# Patient Record
Sex: FEMALE | NOT HISPANIC OR LATINO | URBAN - METROPOLITAN AREA
[De-identification: names, ages, dates, MRNs, and addresses within clinical notes are randomized per-mention and may not be internally consistent; named-entity substitution may affect disease eponyms.]

---

## 2023-12-01 ENCOUNTER — OFFICE VISIT (OUTPATIENT)
Dept: DERMATOLOGY | Facility: CLINIC | Age: 23
End: 2023-12-01
Payer: COMMERCIAL

## 2023-12-01 DIAGNOSIS — L70.0 ACNE VULGARIS: Primary | ICD-10-CM

## 2023-12-01 PROCEDURE — 1036F TOBACCO NON-USER: CPT | Performed by: DERMATOLOGY

## 2023-12-01 PROCEDURE — 99213 OFFICE O/P EST LOW 20 MIN: CPT | Performed by: DERMATOLOGY

## 2023-12-01 RX ORDER — CLINDAMYCIN PHOSPHATE 10 UG/ML
LOTION TOPICAL
COMMUNITY
End: 2023-12-01 | Stop reason: SDUPTHER

## 2023-12-01 RX ORDER — TRETINOIN 0.25 MG/G
CREAM TOPICAL
COMMUNITY
Start: 2021-03-31 | End: 2023-12-01 | Stop reason: SDUPTHER

## 2023-12-01 RX ORDER — SPIRONOLACTONE 50 MG/1
75 TABLET, FILM COATED ORAL DAILY
COMMUNITY
Start: 2023-10-19 | End: 2023-12-01 | Stop reason: SDUPTHER

## 2023-12-01 RX ORDER — CLINDAMYCIN PHOSPHATE 10 UG/ML
LOTION TOPICAL
Qty: 60 ML | Refills: 11 | Status: SHIPPED | OUTPATIENT
Start: 2023-12-01

## 2023-12-01 RX ORDER — TRETINOIN 0.25 MG/G
CREAM TOPICAL
Qty: 45 G | Refills: 5 | Status: SHIPPED | OUTPATIENT
Start: 2023-12-01

## 2023-12-01 RX ORDER — BENZOYL PEROXIDE 50 MG/ML
LIQUID TOPICAL
Qty: 227 G | Refills: 5 | Status: SHIPPED | OUTPATIENT
Start: 2023-12-01

## 2023-12-01 RX ORDER — BENZOYL PEROXIDE 50 MG/ML
LIQUID TOPICAL
COMMUNITY
Start: 2023-08-22 | End: 2023-12-01 | Stop reason: SDUPTHER

## 2023-12-01 RX ORDER — SPIRONOLACTONE 50 MG/1
75 TABLET, FILM COATED ORAL DAILY
Qty: 135 TABLET | Refills: 3 | Status: SHIPPED | OUTPATIENT
Start: 2023-12-01 | End: 2024-11-30

## 2023-12-01 ASSESSMENT — DERMATOLOGY PATIENT ASSESSMENT
ARE YOU ON BIRTH CONTROL: NO
FOR PATIENTS COMING IN FOR A FOLLOW-UP VISIT - HAVE THERE BEEN ANY CHANGES IN YOUR HEALTH SINCE YOUR LAST VISIT: NO
DO YOU HAVE ANY NEW OR CHANGING LESIONS: NO
ARE YOU AN ORGAN TRANSPLANT RECIPIENT: NO
DO YOU USE SUNSCREEN: OCCASIONALLY
ARE YOU TRYING TO GET PREGNANT: NO
DO YOU USE A TANNING BED: NO

## 2023-12-01 ASSESSMENT — DERMATOLOGY QUALITY OF LIFE (QOL) ASSESSMENT
RATE HOW BOTHERED YOU ARE BY EFFECTS OF YOUR SKIN PROBLEMS ON YOUR ACTIVITIES (EG, GOING OUT, ACCOMPLISHING WHAT YOU WANT, WORK ACTIVITIES OR YOUR RELATIONSHIPS WITH OTHERS): 0 - NEVER BOTHERED
RATE HOW EMOTIONALLY BOTHERED YOU ARE BY YOUR SKIN PROBLEM (FOR EXAMPLE, WORRY, EMBARRASSMENT, FRUSTRATION): 0 - NEVER BOTHERED
WHAT SINGLE SKIN CONDITION LISTED BELOW IS THE PATIENT ANSWERING THE QUALITY-OF-LIFE ASSESSMENT QUESTIONS ABOUT: ACNE
RATE HOW BOTHERED YOU ARE BY SYMPTOMS OF YOUR SKIN PROBLEM (EG, ITCHING, STINGING BURNING, HURTING OR SKIN IRRITATION): 0 - NEVER BOTHERED
ARE THERE EXCLUSIONS OR EXCEPTIONS FOR THE QUALITY OF LIFE ASSESSMENT: NO

## 2023-12-01 ASSESSMENT — PATIENT GLOBAL ASSESSMENT (PGA): PATIENT GLOBAL ASSESSMENT: PATIENT GLOBAL ASSESSMENT:  2 - MILD

## 2023-12-01 ASSESSMENT — ITCH NUMERIC RATING SCALE: HOW SEVERE IS YOUR ITCHING?: 0

## 2023-12-01 NOTE — PROGRESS NOTES
Subjective     Esmer Verma is a 23 y.o. female who presents for the following: Acne (Pt here for 3 month acne follow up. Acne is located on face, chest and back. Current treatment BPO 5% wash every day, Sprionolactone 75mg every day, Clindamycin 1% lotion QAM, Tretinoin 0.025% at bedtime(using 2-3x week).  Pt feels acne has improved.).   Doing well now that winter is here a little dry with the topicals. Slightly flared today due to period, otherwise this is the worst it gets.    Review of Systems:  No other skin or systemic complaints other than what is documented elsewhere in the note.    The following portions of the chart were reviewed this encounter and updated as appropriate:          Skin Cancer History  No skin cancer on file.      Specialty Problems    None       Objective   Well appearing patient in no apparent distress; mood and affect are within normal limits.    A focused skin examination was performed of the face, chest, back. All findings within normal limits unless otherwise noted below.    Assessment/Plan   1. Acne vulgaris  Head  Solitary inflammatory papule on right chin, few papules on neck and left shoulder otherwise clear    The chronic and intermittently flaring nature of acne was reviewed with the patient today. Patient advised that acne is multifactorial. Treatment options were reviewed with the patient. Advised that typically takes 2-3 months to see 60-80% improvement and treatments may worsen acne appearance prior to improvement.     Wash face twice daily  Do not spot treat, treat the entire surface area to treat current breakouts and prevent new breakouts    Treatment recommendations:  -continue clindamycin 1% lotion  - continue spironolactone 75mg once daily  - continue tretinoin 0.025% cream - apply to face once daily in evening  - continue 5% benzoyl peroxide cleanser daily in shower    Follow up yearly, discontinue spironolactone, tretinoin and bpo if planning or becomes  pregnant    Related Procedures  Follow Up In Dermatology - Established Patient    Related Medications  spironolactone (Aldactone) 50 mg tablet  Take 1.5 tablets (75 mg) by mouth once daily.    tretinoin (Retin-A) 0.025 % cream  Apply to face daily in evening after washing    benzoyl peroxide 5 % external wash  Apply to face, back in shower for 2-3 minutes then rinse dailyl    clindamycin (Cleocin T) 1 % lotion  APPLY TO AFFECTED AREA EVERY DAY IN THE MORNING

## 2024-12-06 ENCOUNTER — APPOINTMENT (OUTPATIENT)
Dept: DERMATOLOGY | Facility: CLINIC | Age: 24
End: 2024-12-06
Payer: COMMERCIAL

## 2024-12-06 DIAGNOSIS — L70.0 ACNE VULGARIS: ICD-10-CM

## 2024-12-06 PROCEDURE — 99214 OFFICE O/P EST MOD 30 MIN: CPT | Performed by: DERMATOLOGY

## 2024-12-06 PROCEDURE — 1036F TOBACCO NON-USER: CPT | Performed by: DERMATOLOGY

## 2024-12-06 RX ORDER — TRETINOIN 0.5 MG/G
CREAM TOPICAL
Qty: 20 G | Refills: 3 | Status: SHIPPED | OUTPATIENT
Start: 2024-12-06

## 2024-12-06 RX ORDER — SPIRONOLACTONE 100 MG/1
TABLET, FILM COATED ORAL
Qty: 90 TABLET | Refills: 3 | Status: SHIPPED | OUTPATIENT
Start: 2024-12-06

## 2024-12-06 NOTE — PROGRESS NOTES
Subjective     Esmer Verma is a 24 y.o. female who presents for the following: Acne (Annual - acne vulgaris - Patient states her areas of concern is back and face.  Overall the patient states her acne has gotten worse in the past 5 months.  Patient is currently using Spironolactone 75mg daily, Clindamycin lotion, Tretinoin 0.025% cream, and BPO 5% cleanser as directed.).     Review of Systems:  No other skin or systemic complaints other than what is documented elsewhere in the note.    The following portions of the chart were reviewed this encounter and updated as appropriate:          Skin Cancer History  No skin cancer on file.      Specialty Problems    None       Objective   Well appearing patient in no apparent distress; mood and affect are within normal limits.    A focused skin examination was performed of the face, chest and back. All findings within normal limits unless otherwise noted below.    Assessment/Plan   1. Acne vulgaris  Head  Scattered inflammatory papules on chin, jaw line and forehead. Excoriations present    The chronic and intermittently flaring nature of acne was reviewed with the patient today. Patient advised that acne is multifactorial. Treatment options were reviewed with the patient. Advised that typically takes 2-3 months to see 60-80% improvement and treatments may worsen acne appearance prior to improvement.     Wash face twice daily  Do not spot treat, treat the entire surface area to treat current breakouts and prevent new breakouts    Treatment recommendations:  -continue clindamycin 1% lotion  - INCREASE spironolactone to 100 mg once daily due to recent flaring on jawline  - INCREASE tretinoin to 0.05% cream - apply to face once daily in evening - Side effects of topical retinoids reviewed including increased photosensitivity, dryness, irritation and redness. To help alleviate side effects patient advised to apply initially every 2-3 nights and increase to daily as tolerated  or apply topical non-comedogenic moisturizer prior to application.  - continue 5% benzoyl peroxide cleanser daily in shower    Follow up yearly, discontinue spironolactone, tretinoin and bpo if planning or becomes pregnant    Related Procedures  Follow Up In Dermatology - Established Patient    Related Medications  benzoyl peroxide 5 % external wash  Apply to face, back in shower for 2-3 minutes then rinse dailyl    clindamycin (Cleocin T) 1 % lotion  APPLY TO AFFECTED AREA EVERY DAY IN THE MORNING    spironolactone (Aldactone) 100 mg tablet  Take 1 pill by mouth daily    tretinoin (Retin-A) 0.05 % cream  Apply a small 1/2 pea sized amount to clean dry face at bedtime.  Start off 2x/week and increase as tolerated.      Follow up in 1 year or sooner if needed, can contact office with regards to concerns, side effects of medication or if acne is not improving.     Simeon Grant MD  PGY4 Dermatology    I saw and evaluated the patient. I personally obtained the key and critical portions of the history and physical exam or was physically present for key and critical portions performed by the resident/fellow. I reviewed the resident/fellow's documentation and discussed the patient with the resident/fellow. I agree with the resident/fellow's medical decision making as documented in the note.    Emely Mariscal,